# Patient Record
Sex: FEMALE | Race: WHITE | Employment: FULL TIME | URBAN - METROPOLITAN AREA
[De-identification: names, ages, dates, MRNs, and addresses within clinical notes are randomized per-mention and may not be internally consistent; named-entity substitution may affect disease eponyms.]

---

## 2021-08-20 ENCOUNTER — OFFICE VISIT - RIVER FALLS (OUTPATIENT)
Dept: FAMILY MEDICINE | Facility: CLINIC | Age: 26
End: 2021-08-20

## 2021-08-20 ASSESSMENT — MIFFLIN-ST. JEOR: SCORE: 1359.24

## 2021-08-21 LAB — VZV IGG SER QL IA: 576.2

## 2021-08-23 ENCOUNTER — COMMUNICATION - RIVER FALLS (OUTPATIENT)
Dept: FAMILY MEDICINE | Facility: CLINIC | Age: 26
End: 2021-08-23

## 2021-10-01 ENCOUNTER — AMBULATORY - RIVER FALLS (OUTPATIENT)
Dept: FAMILY MEDICINE | Facility: CLINIC | Age: 26
End: 2021-10-01

## 2021-10-04 ENCOUNTER — AMBULATORY - RIVER FALLS (OUTPATIENT)
Dept: FAMILY MEDICINE | Facility: CLINIC | Age: 26
End: 2021-10-04

## 2022-02-11 VITALS
BODY MASS INDEX: 21.49 KG/M2 | OXYGEN SATURATION: 98 % | HEIGHT: 66 IN | DIASTOLIC BLOOD PRESSURE: 68 MMHG | HEART RATE: 81 BPM | SYSTOLIC BLOOD PRESSURE: 106 MMHG | WEIGHT: 133.7 LBS | TEMPERATURE: 98 F

## 2022-02-15 NOTE — NURSING NOTE
CAGE Assessment Entered On:  8/20/2021 12:52 PM CDT    Performed On:  8/20/2021 12:52 PM CDT by Raquel Solano LPN               Assessment   Have you ever felt you should cut down on your drinking :   No   Have people annoyed you by criticizing your drinking :   No   Have you ever felt bad or guilty about your drinking :   No   Have you ever taken a drink first thing in the morning to steady your nerves or get rid of a hangover (Eye-opener) :   No   CAGE Score :   0    Raqule Solano LPN - 8/20/2021 12:52 PM CDT

## 2022-02-15 NOTE — NURSING NOTE
PPD Administration POC Entered On:  10/1/2021 10:31 AM CDT    Performed On:  10/1/2021 10:25 AM CDT by Esthela Chadwick RN               PPD Administration   PPD Insertion Site :   Left forearm   PPD Amount Administered (mL) :   0.1 mL   Esthela Chadwick RN - 10/1/2021 10:29 AM CDT   Details   Collection Date :   10/1/2021 10:25 AM CDT   Expiration Date :   3/17/2023 CDT   Lot#/Manufacture :   [id number]    :   [employer]   POC Test Comments :   Reviewed needs to be read within 48- 72 hours; pt to reschedule TB read appt.   Esthela Chadwick RN - 10/1/2021 10:29 AM CDT

## 2022-02-15 NOTE — PROGRESS NOTES
Patient:   ZENIA MIRELES            MRN: [id number]            FIN: [id number]               Age:   26 years     Sex:  Female     :  1995   Associated Diagnoses:   School physical exam   Author:   Leslie London      Results Review   General results      Health Status   Allergies:    Allergic Reactions (Selected)  Severity Not Documented  Penicillins (Hives)  Sulfa drugs (Hives)  Nonallergic Reactions (Selected)  Severity Not Documented  Azithromycin (Diarrhea)   Medications:  (Selected)   Documented Medications  Documented  Junel Fe 1/20: Oral, daily, 0 Refill(s), Type: Maintenance   Problem list:    All Problems  History of kidney stones / SNOMED CT 6300929332 / Confirmed  Resolved: Obesity / SNOMED CT [id number]      Chief Complaint   needs school PE for nursing program at [facility]  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  She shows me her immunization record from her EHR.  needs varicella titer.  forms completed      Subjective   Needs physcial for [facility] and needs some blood work required by school.      Objective   Vital Signs   2021 10:07 AM CDT Temperature Tympanic 98.0 DegF    Peripheral Pulse Rate 81 bpm    Systolic Blood Pressure 106 mmHg    Diastolic Blood Pressure 68 mmHg    Mean Arterial Pressure 81 mmHg    BP Site Right arm    BP Method Manual    Oxygen Saturation 98 %      Measurements from flowsheet : Measurements   2021 10:07 AM CDT Height Measured - Standard 55.75 in    Weight Measured - Standard 133.7 lb    BSA 1.54 m2    Body Mass Index 30.24 kg/m2  HI      General:  See copy of [facility] physical form.    Eye:  Normal conjunctiva.    HENT:  Normal hearing.    Neck:  Supple, Non-tender, No lymphadenopathy, No thyromegaly.    Respiratory:  Lungs are clear to auscultation, Respirations are non-labored, Breath sounds are equal, Symmetrical chest wall expansion.    Cardiovascular:  Normal rate, Regular rhythm,  No murmur, No gallop, No edema.    Musculoskeletal:  Normal range of motion, Normal strength, No tenderness, No swelling, No deformity, Normal gait.    Integumentary:  Warm, Dry, Pink.    Neurologic:  Alert, Oriented, Normal sensory, Normal motor function, No focal deficits.    Psychiatric:  Cooperative, Appropriate mood & affect.       Plan   Impression and Plan:     Diagnosis     School physical exam ([id number] Z02.0).     .    Orders     Orders (Selected)   Outpatient Orders  Ordered (Dispatched)  Varicella zoster virus ab (IgG)* ([facility]): Specimen Type: Serum, Collection Date: 08/20/21 10:40:00 CDT.     await quant gold results.     .    Counseled:  Patient, Regarding diagnosis, Regarding treatment.    Patient Instructions:  regarding lab results/returning for mantoux read if done today.

## 2022-02-15 NOTE — NURSING NOTE
Depression Screening Entered On:  8/20/2021 12:52 PM CDT    Performed On:  8/20/2021 12:52 PM CDT by Raquel Solano LPN               Depression Screening   Little Interest - Pleasure in Activities :   Not at all   Feeling Down, Depressed, Hopeless :   Not at all   Initial Depression Screen Score :   0 Score   Poor Appetite or Overeating :   Not at all   Trouble Falling or Staying Asleep :   Not at all   Feeling Tired or Little Energy :   Several days   Feeling Bad About Yourself :   Not at all   Trouble Concentrating :   Not at all   Moving or Speaking Slowly :   Not at all   Thoughts Better Off Dead or Hurting Self :   Not at all   Detailed Depression Screen Score :   1    Total Depression Screen Score :   1    Raquel Solano LPN - 8/20/2021 12:52 PM CDT

## 2022-02-15 NOTE — NURSING NOTE
PPD Reading POC Entered On:  10/4/2021 9:05 AM CDT    Performed On:  10/4/2021 9:05 AM CDT by Malgorzata Arreaga CMA               PPD Reading   PPD mm of Induration :   0 mm   PPD Interpretation :   Negative   PPD Completion Status :   Completed   Malgorzata Arreaga CMA - 10/4/2021 9:05 AM CDT

## 2022-02-15 NOTE — TELEPHONE ENCOUNTER
---------------------  From: Leslie London   To: ZENIA MIRELES    Sent: 8/23/2021 11:31:25 AM CDT  Subject: General Message     This shows you have immunity to varicella (chicken pox).  Thank you    OSWALDO Richard      Results:  Date Result Name Value   8/20/2021 10:44 AM Varicella zoster IgG 576.20

## 2022-02-15 NOTE — NURSING NOTE
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   Weight Measured :   133.7 lb(Converted to: 133 lb 11 oz, 60.645 kg)    Raquel Solano LPN - 8/20/2021 10:07 AM CDT   Height Measured :   65.75 in(Converted to: 5 ft 6 in, 167.00 cm)      Body Mass Index :   21.74 kg/m2     Body Surface Area :   1.68 m2   Raquel Solano LPN - 8/20/2021 10:40 AM CDT     Systolic Blood Pressure :   106 mmHg   Diastolic Blood Pressure :   68 mmHg   Mean Arterial Pressure :   81 mmHg   Peripheral Pulse Rate :   81 bpm   BP Site :   Right arm   BP Method :   Manual   Temperature Tympanic :   98.0 DegF(Converted to: 36.7 DegC)    Oxygen Saturation :   98 %   Raquel Solano LPN - 8/20/2021 10:07 AM CDT   Health Status   Allergies Verified? :   Yes   Medication History Verified? :   Yes   Medical History Verified? :   No   Pre-Visit Planning Status :   Completed   Tobacco Use? :   Never smoker   Raquel Solano LPN - 8/20/2021 10:07 AM CDT   Meds / Allergies   (As Of: 8/20/2021 10:15:12 AM CDT)   Allergies (Active)   azithromycin  Estimated Onset Date:   Unspecified ; Reactions:   Diarrhea ; Created By:   Raquel Solano LPN; Reaction Status:   Active ; Category:   Drug ; Substance:   azithromycin ; Type:   Sensitivity ; Updated By:   Raquel Solano LPN; Source:   Patient ; Reviewed Date:   8/20/2021 10:11 AM CDT      penicillins  Estimated Onset Date:   Unspecified ; Reactions:   Hives ; Created By:   Raquel Solano LPN; Reaction Status:   Active ; Category:   Drug ; Substance:   penicillins ; Type:   Allergy ; Updated By:   Raquel Solano LPN; Source:   Patient ; Reviewed Date:   8/20/2021 10:10 AM CDT      sulfa drugs  Estimated Onset Date:   Unspecified ; Reactions:   Hives ; Created By:   Raquel Solano LPN; Reaction Status:    Active ; Category:   Drug ; Substance:   sulfa drugs ; Type:   Allergy ; Updated By:   Raquel Solano LPN; Source:   Patient ; Reviewed Date:   8/20/2021 10:10 AM CDT        Medication List   (As Of: 8/20/2021 10:15:12 AM CDT)   Home Meds    ethinyl estradiol-norethindrone  :   ethinyl estradiol-norethindrone ; Status:   Documented ; Ordered As Mnemonic:   Junel Fe 1/20 ; Simple Display Line:   Oral, daily, 0 Refill(s) ; Catalog Code:   ethinyl estradiol-norethindrone ; Order Dt/Tm:   8/20/2021 10:09:11 AM CDT            Social History   Social History   (As Of: 8/20/2021 10:15:12 AM CDT)   Tobacco:        Never (less than 100 in lifetime)   (Last Updated: 8/20/2021 10:09:22 AM CDT by Raquel Solano LPN)          Electronic Cigarette/Vaping:        Electronic Cigarette Use: Never.   (Last Updated: 8/20/2021 10:09:26 AM CDT by Raquel Solano LPN)

## 2022-03-27 ENCOUNTER — HEALTH MAINTENANCE LETTER (OUTPATIENT)
Age: 27
End: 2022-03-27

## 2022-09-25 ENCOUNTER — HEALTH MAINTENANCE LETTER (OUTPATIENT)
Age: 27
End: 2022-09-25

## 2023-10-14 ENCOUNTER — HEALTH MAINTENANCE LETTER (OUTPATIENT)
Age: 28
End: 2023-10-14